# Patient Record
Sex: FEMALE | Race: WHITE | NOT HISPANIC OR LATINO
[De-identification: names, ages, dates, MRNs, and addresses within clinical notes are randomized per-mention and may not be internally consistent; named-entity substitution may affect disease eponyms.]

---

## 2017-07-05 ENCOUNTER — RESULT REVIEW (OUTPATIENT)
Age: 57
End: 2017-07-05

## 2024-03-07 ENCOUNTER — APPOINTMENT (OUTPATIENT)
Dept: ULTRASOUND IMAGING | Facility: CLINIC | Age: 64
End: 2024-03-07
Payer: COMMERCIAL

## 2024-03-07 ENCOUNTER — OUTPATIENT (OUTPATIENT)
Dept: OUTPATIENT SERVICES | Facility: HOSPITAL | Age: 64
LOS: 1 days | End: 2024-03-07
Payer: COMMERCIAL

## 2024-03-07 DIAGNOSIS — R10.9 UNSPECIFIED ABDOMINAL PAIN: ICD-10-CM

## 2024-03-07 PROCEDURE — 76700 US EXAM ABDOM COMPLETE: CPT | Mod: 26

## 2024-03-07 PROCEDURE — 76700 US EXAM ABDOM COMPLETE: CPT

## 2024-03-21 ENCOUNTER — OUTPATIENT (OUTPATIENT)
Dept: OUTPATIENT SERVICES | Facility: HOSPITAL | Age: 64
LOS: 1 days | End: 2024-03-21
Payer: COMMERCIAL

## 2024-03-21 ENCOUNTER — APPOINTMENT (OUTPATIENT)
Dept: RADIOLOGY | Facility: CLINIC | Age: 64
End: 2024-03-21
Payer: COMMERCIAL

## 2024-03-21 DIAGNOSIS — R07.89 OTHER CHEST PAIN: ICD-10-CM

## 2024-03-21 PROCEDURE — 71046 X-RAY EXAM CHEST 2 VIEWS: CPT | Mod: 26

## 2024-03-21 PROCEDURE — 71046 X-RAY EXAM CHEST 2 VIEWS: CPT

## 2024-06-27 PROBLEM — N88.2 STENOTIC CERVICAL OS: Status: ACTIVE | Noted: 2024-06-27

## 2024-06-27 PROBLEM — Z80.0 FAMILY HISTORY OF PANCREATIC CANCER: Status: ACTIVE | Noted: 2024-06-27

## 2024-06-27 PROBLEM — Z78.0 POSTMENOPAUSAL: Status: ACTIVE | Noted: 2024-06-27

## 2024-06-27 PROBLEM — Z78.9 NEVER SMOKED TOBACCO: Status: ACTIVE | Noted: 2024-06-27

## 2024-06-27 PROBLEM — Z86.39 HISTORY OF HASHIMOTO THYROIDITIS: Status: RESOLVED | Noted: 2024-06-27 | Resolved: 2024-06-27

## 2024-06-27 PROBLEM — R87.610 ASCUS WITH POSITIVE HIGH RISK HPV CERVICAL: Status: ACTIVE | Noted: 2024-06-27

## 2024-06-27 PROBLEM — Z63.4 WIDOWED: Status: ACTIVE | Noted: 2024-06-27

## 2024-07-09 ENCOUNTER — APPOINTMENT (OUTPATIENT)
Dept: GYNECOLOGIC ONCOLOGY | Facility: CLINIC | Age: 64
End: 2024-07-09
Payer: COMMERCIAL

## 2024-07-09 VITALS
WEIGHT: 125 LBS | SYSTOLIC BLOOD PRESSURE: 115 MMHG | OXYGEN SATURATION: 99 % | HEIGHT: 63 IN | DIASTOLIC BLOOD PRESSURE: 78 MMHG | BODY MASS INDEX: 22.15 KG/M2 | HEART RATE: 85 BPM

## 2024-07-09 DIAGNOSIS — N88.2 STRICTURE AND STENOSIS OF CERVIX UTERI: ICD-10-CM

## 2024-07-09 DIAGNOSIS — Z78.9 OTHER SPECIFIED HEALTH STATUS: ICD-10-CM

## 2024-07-09 DIAGNOSIS — Z78.0 ASYMPTOMATIC MENOPAUSAL STATE: ICD-10-CM

## 2024-07-09 DIAGNOSIS — Z86.39 PERSONAL HISTORY OF OTHER ENDOCRINE, NUTRITIONAL AND METABOLIC DISEASE: ICD-10-CM

## 2024-07-09 DIAGNOSIS — R87.610 ATYPICAL SQUAMOUS CELLS OF UNDETERMINED SIGNIFICANCE ON CYTOLOGIC SMEAR OF CERVIX (ASC-US): ICD-10-CM

## 2024-07-09 DIAGNOSIS — Z80.0 FAMILY HISTORY OF MALIGNANT NEOPLASM OF DIGESTIVE ORGANS: ICD-10-CM

## 2024-07-09 DIAGNOSIS — Z63.4 DISAPPEARANCE AND DEATH OF FAMILY MEMBER: ICD-10-CM

## 2024-07-09 DIAGNOSIS — R87.810 ATYPICAL SQUAMOUS CELLS OF UNDETERMINED SIGNIFICANCE ON CYTOLOGIC SMEAR OF CERVIX (ASC-US): ICD-10-CM

## 2024-07-09 DIAGNOSIS — N85.9 NONINFLAMMATORY DISORDER OF UTERUS, UNSPECIFIED: ICD-10-CM

## 2024-07-09 PROCEDURE — 99203 OFFICE O/P NEW LOW 30 MIN: CPT | Mod: 25

## 2024-07-09 PROCEDURE — 57420 EXAM OF VAGINA W/SCOPE: CPT

## 2024-07-09 PROCEDURE — 99459 PELVIC EXAMINATION: CPT

## 2024-07-09 RX ORDER — LEVOTHYROXINE SODIUM 0.15 MG/1
150 TABLET ORAL
Refills: 0 | Status: ACTIVE | COMMUNITY

## 2024-07-09 RX ORDER — MISOPROSTOL 200 UG/1
200 TABLET ORAL
Qty: 2 | Refills: 0 | Status: ACTIVE | COMMUNITY
Start: 2024-07-09 | End: 1900-01-01

## 2024-07-09 SDOH — SOCIAL STABILITY - SOCIAL INSECURITY: DISSAPEARANCE AND DEATH OF FAMILY MEMBER: Z63.4

## 2024-07-25 ENCOUNTER — RESULT REVIEW (OUTPATIENT)
Age: 64
End: 2024-07-25

## 2024-08-28 ENCOUNTER — OUTPATIENT (OUTPATIENT)
Dept: OUTPATIENT SERVICES | Facility: HOSPITAL | Age: 64
LOS: 1 days | End: 2024-08-28

## 2024-08-28 VITALS
SYSTOLIC BLOOD PRESSURE: 123 MMHG | HEIGHT: 62.5 IN | WEIGHT: 128.09 LBS | OXYGEN SATURATION: 98 % | RESPIRATION RATE: 16 BRPM | TEMPERATURE: 97 F | DIASTOLIC BLOOD PRESSURE: 90 MMHG | HEART RATE: 70 BPM

## 2024-08-28 DIAGNOSIS — R87.610 ATYPICAL SQUAMOUS CELLS OF UNDETERMINED SIGNIFICANCE ON CYTOLOGIC SMEAR OF CERVIX (ASC-US): ICD-10-CM

## 2024-08-28 DIAGNOSIS — Z98.890 OTHER SPECIFIED POSTPROCEDURAL STATES: Chronic | ICD-10-CM

## 2024-08-28 DIAGNOSIS — N88.2 STRICTURE AND STENOSIS OF CERVIX UTERI: ICD-10-CM

## 2024-08-28 DIAGNOSIS — Z98.891 HISTORY OF UTERINE SCAR FROM PREVIOUS SURGERY: Chronic | ICD-10-CM

## 2024-08-28 NOTE — H&P PST ADULT - NSICDXPASTMEDICALHX_GEN_ALL_CORE_FT
PAST MEDICAL HISTORY:  Hypothyroidism     Pap smear abnormality of cervix/human papillomavirus (HPV) positive     Stricture and stenosis of cervix

## 2024-08-28 NOTE — H&P PST ADULT - NSANTHOSAYNRD_GEN_A_CORE
No. LAUREANO screening performed.  STOP BANG Legend: 0-2 = LOW Risk; 3-4 = INTERMEDIATE Risk; 5-8 = HIGH Risk

## 2024-08-28 NOTE — H&P PST ADULT - PROBLEM SELECTOR PLAN 2
Patient eligible for lea risk screen age>75? NO    Health care proxy paperwork given to patient? Yes (all patients should be given the packet to fill out at home and return on day of surgery to pre-op RN)    Impaired mobility (ie: uses cane, walker, wheelchair, or assist device)?    Known dementia diagnosis?    Impaired functional status (METS<4)?    Malnutrition BMI<20?

## 2024-08-28 NOTE — H&P PST ADULT - HISTORY OF PRESENT ILLNESS
64 year old postmenopausal  (LMP age 61) referred by Dr. Soler for an ASCUS / +HPV PAP.  Office colposcopy & endocervical curettage was attempted by Dr. Gant on  & 24 (second attempted with cytotec). Per Dr. Gant notes, cervix is flush with vagina with a stenotic os. Pelvic U/S 2024 demonstrated trace fluid in cavity unchanged since , and a f/u sono .7/3/24 demonstrated stabe trace fluid in cavity and stable trace FF in CDS. She was referred for further evaluation due to the cervical stenosis precluding office sampling. 64 year old postmenopausal had ASCUS / +HPV PAP. In Office colposcopy & endocervical curettage was attempted by Dr. Gant on 5/17 & 6/14/24 (second attempted with cytotec); However patient was unable to tolerate it due to stricture and stenotic cervix . She was referred to Dr Barragan further evaluation. Patient is now scheduled for examine under anesthesia colposcopy dilation and curettage with ultrasound tech guidance

## 2024-08-28 NOTE — H&P PST ADULT - PROBLEM SELECTOR PLAN 1
Patient tentatively scheduled for    Pre-op instructions provided.  Famotidine provided with instructions. Hibiclens provided with instructions and was signed by patient. Teach-back method was utilized to assess patient's understanding. Patient verbalized understanding. Patient tentatively scheduled for examine under anesthesia colposcopy dilation and curettage with ultrasound tech guidance     Pre-op instructions provided.  Famotidine provided with instructions. Hibiclens provided with instructions and was signed by patient. Teach-back method was utilized to assess patient's understanding. Patient verbalized understanding. Patient tentatively scheduled for examine under anesthesia colposcopy dilation and curettage with ultrasound tech guidance     Pre-op instructions provided.  Famotidine provided with instructions. Teach-back method was utilized to assess patient's understanding. Patient verbalized understanding.    Patient instructed to take levothyroxine with a sip of water on the morning of procedure.

## 2024-09-09 ENCOUNTER — NON-APPOINTMENT (OUTPATIENT)
Age: 64
End: 2024-09-09

## 2024-09-10 ENCOUNTER — TRANSCRIPTION ENCOUNTER (OUTPATIENT)
Age: 64
End: 2024-09-10

## 2024-09-11 ENCOUNTER — OUTPATIENT (OUTPATIENT)
Dept: OUTPATIENT SERVICES | Facility: HOSPITAL | Age: 64
LOS: 1 days | Discharge: HOME | End: 2024-09-11
Payer: COMMERCIAL

## 2024-09-11 ENCOUNTER — TRANSCRIPTION ENCOUNTER (OUTPATIENT)
Age: 64
End: 2024-09-11

## 2024-09-11 ENCOUNTER — APPOINTMENT (OUTPATIENT)
Dept: GYNECOLOGIC ONCOLOGY | Facility: HOSPITAL | Age: 64
End: 2024-09-11

## 2024-09-11 VITALS
OXYGEN SATURATION: 98 % | SYSTOLIC BLOOD PRESSURE: 100 MMHG | DIASTOLIC BLOOD PRESSURE: 57 MMHG | HEART RATE: 72 BPM | RESPIRATION RATE: 17 BRPM

## 2024-09-11 VITALS — HEIGHT: 62 IN | WEIGHT: 128.09 LBS

## 2024-09-11 DIAGNOSIS — Z98.891 HISTORY OF UTERINE SCAR FROM PREVIOUS SURGERY: Chronic | ICD-10-CM

## 2024-09-11 DIAGNOSIS — R87.610 ATYPICAL SQUAMOUS CELLS OF UNDETERMINED SIGNIFICANCE ON CYTOLOGIC SMEAR OF CERVIX (ASC-US): ICD-10-CM

## 2024-09-11 DIAGNOSIS — Z98.890 OTHER SPECIFIED POSTPROCEDURAL STATES: Chronic | ICD-10-CM

## 2024-09-11 PROCEDURE — 56441 LYSIS OF LABIAL ADHESIONS: CPT

## 2024-09-11 PROCEDURE — 58120 DILATION AND CURETTAGE: CPT

## 2024-09-11 PROCEDURE — 57420 EXAM OF VAGINA W/SCOPE: CPT

## 2024-09-11 PROCEDURE — 88305 TISSUE EXAM BY PATHOLOGIST: CPT | Mod: 26

## 2024-09-11 PROCEDURE — 88342 IMHCHEM/IMCYTCHM 1ST ANTB: CPT | Mod: 26

## 2024-09-11 RX ORDER — LEVOTHYROXINE SODIUM 100 MCG
1 TABLET ORAL
Refills: 0 | DISCHARGE

## 2024-09-11 RX ORDER — BACILLUS COAGULANS/INULIN 21B-1 G
1 TABLET,CHEWABLE ORAL
Refills: 0 | DISCHARGE

## 2024-09-11 RX ORDER — ESTRADIOL 0.1 MG/G
1 CREAM VAGINAL
Refills: 0 | DISCHARGE

## 2024-09-11 NOTE — ASU DISCHARGE PLAN (ADULT/PEDIATRIC) - ***IN THE EVENT THAT YOU DEVELOP A COMPLICATION AND YOU ARE UNABLE TO REACH YOUR OWN PHYSICIAN, YOU MAY CONTACT:
Tele neuro consult completed at this time. VSS. Pt. With no complaints. Will continue to monitor. Statement Selected

## 2024-09-11 NOTE — BRIEF OPERATIVE NOTE - NSICDXBRIEFPROCEDURE_GEN_ALL_CORE_FT
PROCEDURES:  Colposcopy, cervix 11-Sep-2024 13:52:33  Joyce Menjivar  Dilation and curettage, uterus, with US guidance 11-Sep-2024 13:53:11  Joyce Menjivar

## 2024-09-11 NOTE — BRIEF OPERATIVE NOTE - NSICDXBRIEFPOSTOP_GEN_ALL_CORE_FT
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

## 2024-09-11 NOTE — ASU DISCHARGE PLAN (ADULT/PEDIATRIC) - NURSING INSTRUCTIONS
You were given intravenous TYLENOL for pain management at ___12:45pm___. Please DO NOT take any products containing TYLENOL or ACETAMINOPHEN, such as VICODIN, PERCOCET, EXCEDRIN, and any over-the-counter cold medication for the next 6 hours (until _6:45pm_____________). DO NOT TAKE MORE THAN 3000 MG OF TYLENOL in a 24 hour period.   You were given intravenous TORADOL for pain management at ______1:30pm___. Please DO NOT take ibuprofen containing products such as MOTRIN or ADVIL, or any other NSAIDs (Non-Steroidal Anti-Inflammatory Drugs) such as ALEVE for the next 6 hours (until ___7:30pm___________).

## 2024-09-11 NOTE — BRIEF OPERATIVE NOTE - NSICDXBRIEFPREOP_GEN_ALL_CORE_FT
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

## 2024-09-11 NOTE — ASU DISCHARGE PLAN (ADULT/PEDIATRIC) - PROVIDER TOKENS
PROVIDER:[TOKEN:[[id number]],SCHEDULEDAPPT:[10/01/2024],SCHEDULEDAPPTTIME:[09:00 AM],ESTABLISHEDPATIENT:[T]]

## 2024-09-11 NOTE — ASU DISCHARGE PLAN (ADULT/PEDIATRIC) - NS MD DC FALL RISK RISK
For information on Fall & Injury Prevention, visit: [email or web address] OR  [email or web address] OR  https://www.cdc.gov/steadi/patient.html

## 2024-09-11 NOTE — ASU DISCHARGE PLAN (ADULT/PEDIATRIC) - CARE PROVIDER_API CALL
Regina Ohara  Gynecologic Oncology  [address] [place] [address]  Phone: [phone]  Fax: [phone]  Established Patient  Scheduled Appointment: 10/01/2024 09:00 AM

## 2024-09-11 NOTE — ASU DISCHARGE PLAN (ADULT/PEDIATRIC) - ASU DC SPECIAL INSTRUCTIONSFT
Postoperative Instructions    For pain control, take the followin. Ibuprofen 600mg every 6 hours, take with food  2. Add 975mg every 6 hours, alternated with ibuprofen  Tylenol and ibuprofen may be obtained over the counter.    Return to your regular way of eating.     Resume normal activity as tolerated, but no heavy lifting or strenuous activity for 6 weeks. Complete vaginal rest, no tampons, no douching, no tub bathing, no sexual activities for 6 weeks unless otherwise instructed by your doctor.      No driving while on narcotic pain medication.      Call your doctor with any signs and symptoms of infection such as fever (>100.4 F), chills, nausea or vomiting.  Call your doctor if you're unable to tolerate food or have difficulty urinating.  Call your doctor if you have pain that is not relieved by your prescribed medications. Call your doctor with redness or swelling at the incision site, fluid leakage or wound separation.    Notify your doctor with any other concerns. Follow up with your doctor a post-operative appointment.

## 2024-09-11 NOTE — BRIEF OPERATIVE NOTE - OPERATION/FINDINGS
Exam under anesthesia grossly normal external genitalia. Narrow urethral meatus. Grossly normal, atrophic vaginal tissue and cervical tissue. Cervix flush and deviated to the left. Pinpoint external cervical os, stenotic. On ultrasound, uterus appeared minimally retroverted without masses. Thin endometrial strip appreciated.  Exam under anesthesia grossly normal external genitalia. Narrow urethral meatus. Grossly normal, atrophic vaginal tissue and cervical tissue. Cervix flush and deviated to the left. Pinpoint external cervical os, stenotic. On ultrasound, uterus appeared minimally retroverted without masses. Thin endometrial strip appreciated. Entire procedure performed under sono technician guidance.

## 2024-09-18 ENCOUNTER — NON-APPOINTMENT (OUTPATIENT)
Age: 64
End: 2024-09-18

## 2024-09-24 LAB — SURGICAL PATHOLOGY STUDY: SIGNIFICANT CHANGE UP

## 2024-09-30 ENCOUNTER — NON-APPOINTMENT (OUTPATIENT)
Age: 64
End: 2024-09-30

## 2024-10-01 ENCOUNTER — APPOINTMENT (OUTPATIENT)
Dept: GYNECOLOGIC ONCOLOGY | Facility: CLINIC | Age: 64
End: 2024-10-01
Payer: COMMERCIAL

## 2024-10-01 VITALS
SYSTOLIC BLOOD PRESSURE: 111 MMHG | RESPIRATION RATE: 16 BRPM | HEART RATE: 80 BPM | OXYGEN SATURATION: 99 % | DIASTOLIC BLOOD PRESSURE: 76 MMHG

## 2024-10-01 DIAGNOSIS — R87.810 ATYPICAL SQUAMOUS CELLS OF UNDETERMINED SIGNIFICANCE ON CYTOLOGIC SMEAR OF CERVIX (ASC-US): ICD-10-CM

## 2024-10-01 DIAGNOSIS — R87.610 ATYPICAL SQUAMOUS CELLS OF UNDETERMINED SIGNIFICANCE ON CYTOLOGIC SMEAR OF CERVIX (ASC-US): ICD-10-CM

## 2024-10-01 DIAGNOSIS — N85.9 NONINFLAMMATORY DISORDER OF UTERUS, UNSPECIFIED: ICD-10-CM

## 2024-10-01 PROCEDURE — 99212 OFFICE O/P EST SF 10 MIN: CPT

## 2024-10-01 RX ORDER — ESTRADIOL 10 UG/1
INSERT VAGINAL
Refills: 0 | Status: ACTIVE | COMMUNITY

## 2024-10-01 NOTE — ASSESSMENT
[FreeTextEntry1] : 63y/o s/p HSC/D&C with benign pathology, healing well.   PLAN: Final pathology results were reviewed in detail with the patient and she was given a copy for her records. Instructions were reviewed.  She was advised that she shoudl continue to follow up with Dr. Soler  for routine gynecologic care and ultrasound monitoring at her discretion, and in any case of postmenopausal bleeding.  She is aware I remain available to her as needed.  All questions were answered to her apparent satisfaction.

## 2024-10-01 NOTE — REASON FOR VISIT
[Post Op] : post op visit [de-identified] : 9/11/24 [de-identified] : HSC/D&C with sono guidance [de-identified] : She reports that she has had a very smooth recovery with no major  issues. She had mild possible UTI sx and called Dr Soler, started abx with resolution of sx. She denies abnl vaginal bleeding, pelvic or abdominal pain or urinary or bowel complaints. No longer requiring pain medication. Returning to usual activities and maintaining pelvic rest.

## 2024-10-01 NOTE — DISCUSSION/SUMMARY
[Firm] : soft [Tender] : nontender [Cervical Abnormality] : normal cervix [External Genitalia Abnormal] : normal external genitalia [Vaginal Exam Abnormal] : normal vaginal exam [Doing Well] : is doing well [Excellent Pain Control] : has excellent pain control [No Sign of Infection] : is showing no signs of infection [0] : 0 [de-identified] : normal [de-identified] : no restrictions [FreeTextEntry1] : Pathology:  1.  Endocervical curettings -   Benign squamous and endocervical mucosa. See note   2.  Endometrial curettings -   Scant strips of benign inactive endometrium and endocervical mucosa. See note Note: Part2 1 and 2;Immunohistochemical stain p16 is negative

## (undated) DEVICE — DRAPE 3/4 SHEET 52X76"

## (undated) DEVICE — DRAPE UNDER BUTTOCKS W SCREEN

## (undated) DEVICE — DRAPE TOWEL BLUE 17" X 24"

## (undated) DEVICE — DRSG TELFA 3 X 8

## (undated) DEVICE — FOLEY CATH 2-WAY 18FR 5CC SILICONE

## (undated) DEVICE — SOL ANTI FOG

## (undated) DEVICE — DRAPE INSTRUMENT POUCH 6.75" X 11"

## (undated) DEVICE — SPECIMEN CONTAINER 100ML

## (undated) DEVICE — DRAPE LAVH 124" X 30" X125"

## (undated) DEVICE — SOL IRR POUR NS 0.9% 500ML

## (undated) DEVICE — GLV 6 PROTEXIS (WHITE)

## (undated) DEVICE — WARMING BLANKET UPPER ADULT

## (undated) DEVICE — POSITIONER FOAM EGG CRATE ULNAR 2PCS (PINK)

## (undated) DEVICE — ELCTR CUTTING LOOP 24FR RIGHT ANGLE

## (undated) DEVICE — LAP PAD 18 X 18"

## (undated) DEVICE — DRAPE 1/2 SHEET 40X57"

## (undated) DEVICE — DRAPE MAYO STAND 23"

## (undated) DEVICE — CABLE DAC ACTIVE CORD

## (undated) DEVICE — FOLEY TRAY 16FR LF URINE METER SURESTEP

## (undated) DEVICE — VENODYNE/SCD SLEEVE CALF MEDIUM

## (undated) DEVICE — TUBING SUCTION 20FT

## (undated) DEVICE — CANISTER SUCTION 2000CC

## (undated) DEVICE — GLV 6.5 PROTEXIS (BLUE)

## (undated) DEVICE — GOWN LG

## (undated) DEVICE — PACK MAJOR CHEST BREAST

## (undated) DEVICE — STAPLER SKIN PROXIMATE ROTATING WIDE

## (undated) DEVICE — PREP BETADINE KIT

## (undated) DEVICE — ELCTR CUTTING LOOP 24FR 12 DEG 0.35 WIRE

## (undated) DEVICE — SOL IRR POUR H2O 250ML

## (undated) DEVICE — PACK D&C